# Patient Record
Sex: FEMALE | Race: WHITE | URBAN - METROPOLITAN AREA
[De-identification: names, ages, dates, MRNs, and addresses within clinical notes are randomized per-mention and may not be internally consistent; named-entity substitution may affect disease eponyms.]

---

## 2022-07-09 ENCOUNTER — TELEPHONE ENCOUNTER (OUTPATIENT)
Dept: URBAN - METROPOLITAN AREA CLINIC 121 | Facility: CLINIC | Age: 67
End: 2022-07-09

## 2022-07-10 ENCOUNTER — TELEPHONE ENCOUNTER (OUTPATIENT)
Dept: URBAN - METROPOLITAN AREA CLINIC 121 | Facility: CLINIC | Age: 67
End: 2022-07-10

## 2023-03-10 ENCOUNTER — OFFICE VISIT (OUTPATIENT)
Dept: URBAN - METROPOLITAN AREA CLINIC 63 | Facility: CLINIC | Age: 68
End: 2023-03-10

## 2023-03-10 RX ORDER — FAMOTIDINE 40 MG/1
TAKE ONE TABLET BY MOUTH AT BEDTIME TABLET, FILM COATED ORAL
Qty: 90 UNSPECIFIED | Refills: 0 | Status: ACTIVE | COMMUNITY

## 2023-03-10 RX ORDER — CEFADROXIL 500 MG/1
TAKE ONE CAPSULE BY MOUTH TWICE A DAY AS DIRECTED. BEGIN TAKING ON THE MORNING OF PROCEDURE CAPSULE ORAL
Qty: 14 UNSPECIFIED | Refills: 0 | Status: ACTIVE | COMMUNITY

## 2023-03-10 RX ORDER — LOSARTAN POTASSIUM 100 MG/1
TAKE ONE TABLET BY MOUTH ONE TIME DAILY TABLET, FILM COATED ORAL
Qty: 90 UNSPECIFIED | Refills: 0 | Status: ACTIVE | COMMUNITY

## 2023-03-17 ENCOUNTER — DASHBOARD ENCOUNTERS (OUTPATIENT)
Age: 68
End: 2023-03-17

## 2023-03-23 ENCOUNTER — OFFICE VISIT (OUTPATIENT)
Dept: URBAN - METROPOLITAN AREA TELEHEALTH 1 | Facility: TELEHEALTH | Age: 68
End: 2023-03-23

## 2023-03-23 RX ORDER — CEFADROXIL 500 MG/1
TAKE ONE CAPSULE BY MOUTH TWICE A DAY AS DIRECTED. BEGIN TAKING ON THE MORNING OF PROCEDURE CAPSULE ORAL
Qty: 14 UNSPECIFIED | Refills: 0 | COMMUNITY

## 2023-03-23 RX ORDER — FAMOTIDINE 40 MG/1
TAKE ONE TABLET BY MOUTH AT BEDTIME TABLET, FILM COATED ORAL
Qty: 90 UNSPECIFIED | Refills: 0 | COMMUNITY

## 2023-03-23 RX ORDER — LOSARTAN POTASSIUM 100 MG/1
TAKE ONE TABLET BY MOUTH ONE TIME DAILY TABLET, FILM COATED ORAL
Qty: 90 UNSPECIFIED | Refills: 0 | COMMUNITY

## 2023-03-23 NOTE — HPI-TODAY'S VISIT:
Thais is a pleasant 67-year-old female who presents today for evaluation of a screening colonoscopy  Labs dated 2/24/2023 showed glucose 102.  Normal renal function.  LFTs.  Normal TSH.  Normal T4.  Normal T3.  Normal hemoglobin.  Normal platelets.